# Patient Record
Sex: MALE | Race: WHITE | NOT HISPANIC OR LATINO | Employment: FULL TIME | URBAN - METROPOLITAN AREA
[De-identification: names, ages, dates, MRNs, and addresses within clinical notes are randomized per-mention and may not be internally consistent; named-entity substitution may affect disease eponyms.]

---

## 2021-09-15 ENCOUNTER — OFFICE VISIT (OUTPATIENT)
Dept: ORTHOPEDIC SURGERY | Facility: CLINIC | Age: 41
End: 2021-09-15

## 2021-09-15 VITALS
HEART RATE: 84 BPM | SYSTOLIC BLOOD PRESSURE: 126 MMHG | WEIGHT: 188 LBS | BODY MASS INDEX: 27.85 KG/M2 | DIASTOLIC BLOOD PRESSURE: 81 MMHG | HEIGHT: 69 IN

## 2021-09-15 DIAGNOSIS — M25.571 RIGHT ANKLE PAIN, UNSPECIFIED CHRONICITY: Primary | ICD-10-CM

## 2021-09-15 DIAGNOSIS — M25.371 ANKLE INSTABILITY, RIGHT: ICD-10-CM

## 2021-09-15 PROCEDURE — 99204 OFFICE O/P NEW MOD 45 MIN: CPT | Performed by: ORTHOPAEDIC SURGERY

## 2021-09-15 RX ORDER — ERGOCALCIFEROL 1.25 MG/1
50000 CAPSULE ORAL WEEKLY
COMMUNITY
Start: 2021-07-28

## 2021-09-15 RX ORDER — NAPROXEN 500 MG/1
TABLET ORAL
COMMUNITY
Start: 2021-07-28

## 2021-09-15 RX ORDER — TRAZODONE HYDROCHLORIDE 50 MG/1
50 TABLET ORAL NIGHTLY PRN
COMMUNITY
Start: 2021-07-28

## 2021-09-15 RX ORDER — TRAMADOL HYDROCHLORIDE 50 MG/1
50 TABLET ORAL EVERY 6 HOURS PRN
COMMUNITY

## 2021-09-15 RX ORDER — LIDOCAINE 50 MG/G
PATCH TOPICAL
COMMUNITY
Start: 2021-07-28

## 2021-09-15 RX ORDER — ROSUVASTATIN CALCIUM 20 MG/1
TABLET, COATED ORAL
COMMUNITY
Start: 2021-08-07

## 2021-09-15 RX ORDER — METOPROLOL SUCCINATE 25 MG/1
25 TABLET, EXTENDED RELEASE ORAL DAILY
COMMUNITY
Start: 2021-07-28

## 2021-09-15 RX ORDER — CITALOPRAM 20 MG/1
20 TABLET ORAL DAILY
COMMUNITY
Start: 2021-07-28

## 2021-09-15 NOTE — PROGRESS NOTES
NEW PATIENT    Patient: Geovanny Vaughn  : 1980    Primary Care Provider: Maritza Caldwell APRN    Requesting Provider: As above    Pain and Initial Evaluation of the Right Ankle (x 1 year after jumping on trampoline, recently fell down stairs after right ankle gave out)      History    Chief Complaint: right ankle pain    History of Present Illness: this is a very pleasant 40 year old man here with his wife.  He had an inversion injury to the right ankle 1 year ago while jumping on a trampoline.  He had X-rays that by report are negative.  He was seen by podiatry and an MRI was done 3/3/2021  We have the report but not the films.  The report indicates significant edema in the talus and no ligament injury.  He did 6 weeks of PT.  He reports continue pain and swelling and a feeling of instability.  He  Reports the ankle will give out and he falls.  He is a  and has significant pain and swelling at the end of a work day.  He was given a sherron brace- he reports it does not help and sometimes causes pain.  He is here for a second opinion. He smokes at least 1/2ppd, I strongly recommend quitting smoking.  I explained the risks of smoking, including hardening of the arteries, gangrene, amputation.  I explained nicotine poisons bone cells and increases the risk of nonunion of fractures and fusions.  I explained that studies show that smokers have FOUR times the risk of the general population when they have foot or ankle surgery.  (5min)  .      Current Outpatient Medications on File Prior to Visit   Medication Sig Dispense Refill   • citalopram (CeleXA) 20 MG tablet Take 20 mg by mouth Daily.     • lidocaine (LIDODERM) 5 % APPLY 1 PATCH TOPICALLY ONCE DAILY REMOVE AFTER 12 HOURS     • metoprolol succinate XL (TOPROL-XL) 25 MG 24 hr tablet Take 25 mg by mouth Daily.     • naproxen (NAPROSYN) 500 MG tablet TAKE 1 TABLET BY MOUTH WITH FOOD AS NEEDED EVERY 12 HOURS     • rosuvastatin (CRESTOR) 20 MG tablet      •  traMADol (ULTRAM) 50 MG tablet Take 50 mg by mouth Every 6 (Six) Hours As Needed for Moderate Pain . 2 tabs at a time     • traZODone (DESYREL) 50 MG tablet Take 50 mg by mouth At Night As Needed.     • vitamin D (ERGOCALCIFEROL) 1.25 MG (99923 UT) capsule capsule Take 50,000 Units by mouth 1 (One) Time Per Week.       No current facility-administered medications on file prior to visit.      No Known Allergies   Past Medical History:   Diagnosis Date   • Anxiety    • HLD (hyperlipidemia)    • Insomnia    • Tachycardia    • Vitamin D deficiency      Past Surgical History:   Procedure Laterality Date   • FOREARM FRACTURE SURGERY       Family History   Problem Relation Age of Onset   • Arthritis Mother    • Hypertension Mother    • Cancer Father    • Hypertension Father       Social History     Socioeconomic History   • Marital status:      Spouse name: Not on file   • Number of children: Not on file   • Years of education: Not on file   • Highest education level: Not on file   Tobacco Use   • Smoking status: Current Every Day Smoker     Packs/day: 0.50   • Smokeless tobacco: Never Used   Substance and Sexual Activity   • Alcohol use: Never   • Drug use: Never   • Sexual activity: Defer        Review of Systems   Constitutional: Negative for activity change, appetite change, chills, diaphoresis, fatigue, fever and unexpected weight change.   HENT: Positive for dental problem. Negative for congestion, drooling, ear discharge, ear pain, facial swelling, hearing loss, mouth sores, nosebleeds, postnasal drip, rhinorrhea, sinus pressure, sneezing, sore throat, tinnitus, trouble swallowing and voice change.    Eyes: Negative for photophobia, pain, discharge, redness, itching and visual disturbance.   Respiratory: Negative for apnea, cough, choking, chest tightness, shortness of breath, wheezing and stridor.    Cardiovascular: Negative for chest pain, palpitations and leg swelling.   Gastrointestinal: Positive for  "nausea. Negative for abdominal distention, abdominal pain, anal bleeding, blood in stool, constipation, diarrhea, rectal pain and vomiting.   Endocrine: Negative for cold intolerance, heat intolerance, polydipsia, polyphagia and polyuria.   Genitourinary: Negative for decreased urine volume, difficulty urinating, dysuria, enuresis, flank pain, frequency, genital sores, hematuria and urgency.   Musculoskeletal: Positive for arthralgias and back pain. Negative for gait problem, joint swelling, myalgias, neck pain and neck stiffness.   Skin: Negative for color change, pallor, rash and wound.   Allergic/Immunologic: Negative for environmental allergies, food allergies and immunocompromised state.   Neurological: Negative for dizziness, tremors, seizures, syncope, facial asymmetry, speech difficulty, weakness, light-headedness, numbness and headaches.   Hematological: Negative for adenopathy. Does not bruise/bleed easily.   Psychiatric/Behavioral: Negative for agitation, behavioral problems, confusion, decreased concentration, dysphoric mood, hallucinations, self-injury, sleep disturbance and suicidal ideas. The patient is not nervous/anxious and is not hyperactive.        The following portions of the patient's history were reviewed and updated as appropriate: allergies, current medications, past family history, past medical history, past social history, past surgical history and problem list.    Physical Exam:   /81   Pulse 84   Ht 175.3 cm (69\")   Wt 85.3 kg (188 lb)   BMI 27.76 kg/m²   GENERAL: Body habitus: normal weight for height    Lower extremity edema: Right: none; Left: none    Varicose veins:  Right: none; Left: none    Gait: normal     Mental Status:  awake and alert; oriented to person, place, and time    Voice:  clear  SKIN:  Lower extremity: Normal    Hair Growth(lower extremity):  Right:normal; Left:  normal  NAILS: Toenails: normal  HEENT: Head: Normocephalic, atraumatic,  without obvious " abnormality.  eye: normal external eye, no icterus  ears:normal external ears  PULM:  Repiratory effort normal  CV:  Dorsalis Pedis:  Right: 2+; Left:2+    Posterior Tibial: Right:2+; Left:2+    Capillary Refill:  Brisk  MSK:  Hand:right handed      Tibia:  Right:  non tender; Left:  non tender      Ankle:  Right: tender over ATFL, slight thickening compared to left ankle, ROM is normal and symmetric, pain at the end of inversion, no instability to anterior drawer; Left:  non tender, ROM  normal and motor function  normal      Foot:  Right:  non tender; Left:  non tender      NEURO: Heel Walking:  Right:  reports he is unable to do this due to pain and fear, he is able to dorsiflex the foot off the ground; Left:  difficult to coordinate when he is having trouble on the right    Toe Walking:  Right:  reports he is unable to do this due to pain and fear; Left:  similar difficulty with coordination     Camden-Beth 5.07 monofilament test: normal    Lower extremity sensation: intact     Reflexes:  Biceps:  Right:  not tested; Left:  not tested           Quads:  Right:  not tested; Left:  not tested           Ankle:  Right:  not tested; Left:  not tested      Calf Atrophy:1cm right calf atrophy    Motor Function: all motors fire, give way on right due to fear and some pain         Medical Decision Making    Data Review:   ordered and reviewed x-rays today, reviewed prior lab results, reviewed radiology results and reviewed outside records    Assessment and Plan/ Diagnosis/Treatment options:   1. Right ankle pain, unspecified chronicity  I think the primary problem here is that he has not had adequate rehab.  He is unable to walk on toes/heels due to fear/pain and I explained that even with instability someone should be able to rehab to the point they can do this.  I think he needs better PT- he needs a therapist who can help him get past the fear of doing more damage and regain normal strength and coordination.  I  "explained that this needs to be done prior to even considering any type of surgery.  He reports that \"he wants the ankle fixed\" and I explained that PT is the way to \"fix\" this.   I searched for PT offices in [place] and gave him some names.  I want to repeat the MRI here in [place] (better magnet) and I want him to bring the prior MRI disc at the next visit.  I explained that even if the MRI shows ligament tears I would recommend this same course of treatment at this time.  I will see him after the MRI.    - XR Ankle 2 View Right  - MRI Ankle Right Without Contrast; Future  - Ambulatory Referral to Physical Therapy            Radiology Ordered []  Radiology Reports Reviewed []      Radiology Images Reviewed []   Labs Reviewed []    Labs Ordered []   PCP Records Reviewed []    Provider Records Reviewed []    ER Records Reviewed []    Hospital Records Reviewed []    History Obtained From Family []    Phone conversation with Provider []    Records Requested []        Armida Sapp MD    "

## 2021-10-13 ENCOUNTER — TELEPHONE (OUTPATIENT)
Dept: ORTHOPEDIC SURGERY | Facility: CLINIC | Age: 41
End: 2021-10-13